# Patient Record
Sex: MALE | Race: BLACK OR AFRICAN AMERICAN | NOT HISPANIC OR LATINO | URBAN - METROPOLITAN AREA
[De-identification: names, ages, dates, MRNs, and addresses within clinical notes are randomized per-mention and may not be internally consistent; named-entity substitution may affect disease eponyms.]

---

## 2023-05-26 ENCOUNTER — EMERGENCY (EMERGENCY)
Facility: HOSPITAL | Age: 63
LOS: 0 days | Discharge: HOME | End: 2023-05-26
Attending: STUDENT IN AN ORGANIZED HEALTH CARE EDUCATION/TRAINING PROGRAM
Payer: MEDICAID

## 2023-05-26 VITALS
OXYGEN SATURATION: 97 % | HEART RATE: 88 BPM | RESPIRATION RATE: 18 BRPM | SYSTOLIC BLOOD PRESSURE: 133 MMHG | DIASTOLIC BLOOD PRESSURE: 88 MMHG | HEIGHT: 73 IN | WEIGHT: 220.9 LBS | TEMPERATURE: 98 F

## 2023-05-26 DIAGNOSIS — R22.9 LOCALIZED SWELLING, MASS AND LUMP, UNSPECIFIED: ICD-10-CM

## 2023-05-26 DIAGNOSIS — N47.2 PARAPHIMOSIS: ICD-10-CM

## 2023-05-26 DIAGNOSIS — N48.89 OTHER SPECIFIED DISORDERS OF PENIS: ICD-10-CM

## 2023-05-26 PROCEDURE — 99283 EMERGENCY DEPT VISIT LOW MDM: CPT

## 2023-05-26 NOTE — ED PROVIDER NOTE - NSFOLLOWUPINSTRUCTIONS_ED_ALL_ED_FT
Paraphimosis    Paraphimosis is where the foreskin can't be returned to its original position after being retracted.    It causes the glans to become painful and swollen and requires emergency medical treatment to avoid serious complications, such as increased pain, swelling and restricted blood flow to the penis.    It may be possible to reduce the pain and inflammation by applying a local anaesthetic gel to the penis and pressing on the glans while pushing the foreskin forward.    In difficult cases, it may be necessary to make a small slit in the foreskin to help relieve the pressure.    In severe cases of paraphimosis, circumcision may be recommended. In very severe cases, a lack of blood flow to the penis can cause tissue death (gangrene) and surgical removal of the penis may be necessary.    Penis hygiene    It's important to clean your penis regularly to avoid problems developing.    You should:    gently wash your penis with warm water each day while having a bath or shower  gently pull back your foreskin (if you have one) and wash underneath; don't pull back the foreskin of a baby or young boy because it could be painful and cause harm  use a mild or non-perfumed soap (if you choose to use soap) to reduce the risk of skin irritation  avoid using talc and deodorants on your penis as they may cause irritation  Circumcised men should also regularly clean their penis with warm water and a mild soap (if you choose to use soap).

## 2023-05-26 NOTE — ED ADULT TRIAGE NOTE - CHIEF COMPLAINT QUOTE
Patient sent to ER from urgent care for swelling to penis for the past 2 days, Denies any burning with urination and discharge to site.

## 2023-05-26 NOTE — ED PROVIDER NOTE - PATIENT PORTAL LINK FT
You can access the FollowMyHealth Patient Portal offered by [facility] by registering at the following website: [email or web address]. By joining [employer]’s FollowMyHealth portal, you will also be able to view your health information using other applications (apps) compatible with our system.

## 2023-05-26 NOTE — ED PROVIDER NOTE - CLINICAL SUMMARY MEDICAL DECISION MAKING FREE TEXT BOX
63-year-old male reports swelling to head of penis after pulling foreskin back yesterday last night when cleaning.  Patient reports pain started then was unable to pull foreskin forward.  Patient denies any dysuria urgency or frequency, reports no other complaints.  Denies any testicular pain    paraphimosis successfully reduced   f/u urology   return precautions

## 2023-05-26 NOTE — ED ADULT NURSE NOTE - NSFALLUNIVINTERV_ED_ALL_ED
Bed/Stretcher in lowest position, wheels locked, appropriate side rails in place/Call bell, personal items and telephone in reach/Instruct patient to call for assistance before getting out of bed/chair/stretcher/Non-slip footwear applied when patient is off stretcher/[place] to call system/Physically safe environment - no spills, clutter or unnecessary equipment/Purposeful proactive rounding/Room/bathroom lighting operational, light cord in reach

## 2023-05-26 NOTE — ED PROVIDER NOTE - OBJECTIVE STATEMENT
63-year-old male reports swelling to head of penis after pulling foreskin back yesterday last night when cleaning.  Patient reports pain started then was unable to pull foreskin forward.  Patient denies any dysuria urgency or frequency, reports no other complaints.  Denies any testicular pain

## 2023-05-26 NOTE — ED ADULT NURSE NOTE - OBJECTIVE STATEMENT
Patient alert and verbally responsive, sent to ER from urgent care for swelling to penis for the past 2 days, Denies any burning with urination and discharge to site.  Denies and injuries to site.

## 2023-05-26 NOTE — ED PROVIDER NOTE - PHYSICAL EXAMINATION
VITAL SIGNS: I have reviewed nursing notes and confirm.  CONSTITUTIONAL: well-appearing, non-toxic, NAD  SKIN: Warm dry, normal skin turgor  CARD: RRR, no murmurs, rubs or gallops  RESP: clear to ausculation b/l.  No rales, rhonchi, or wheezing.  ABD: soft, + BS, non-tender, non-distended,  : paraphimosis noted with glans swelling .  PSYCH: Cooperative, appropriate.

## 2023-05-26 NOTE — ED PROVIDER NOTE - CARE PROVIDER_API CALL
Misha James  Urology  [address] [place] [address]  Phone: [phone]  Fax: [phone]  Follow Up Time: 1-3 Days

## 2023-05-31 ENCOUNTER — EMERGENCY (EMERGENCY)
Facility: HOSPITAL | Age: 63
LOS: 0 days | Discharge: HOME | End: 2023-05-31
Payer: MEDICAID

## 2023-05-31 VITALS
HEIGHT: 73 IN | TEMPERATURE: 99 F | WEIGHT: 220.9 LBS | RESPIRATION RATE: 19 BRPM | SYSTOLIC BLOOD PRESSURE: 120 MMHG | DIASTOLIC BLOOD PRESSURE: 83 MMHG | HEART RATE: 73 BPM | OXYGEN SATURATION: 100 %

## 2023-05-31 VITALS
TEMPERATURE: 98 F | OXYGEN SATURATION: 99 % | HEART RATE: 62 BPM | SYSTOLIC BLOOD PRESSURE: 129 MMHG | RESPIRATION RATE: 16 BRPM | DIASTOLIC BLOOD PRESSURE: 81 MMHG

## 2023-05-31 DIAGNOSIS — N47.2 PARAPHIMOSIS: ICD-10-CM

## 2023-05-31 DIAGNOSIS — R22.9 LOCALIZED SWELLING, MASS AND LUMP, UNSPECIFIED: ICD-10-CM

## 2023-05-31 DIAGNOSIS — Z87.438 PERSONAL HISTORY OF OTHER DISEASES OF MALE GENITAL ORGANS: ICD-10-CM

## 2023-05-31 LAB
ALBUMIN SERPL ELPH-MCNC: 3.7 G/DL — SIGNIFICANT CHANGE UP (ref 3.3–5)
ALP SERPL-CCNC: 81 U/L — SIGNIFICANT CHANGE UP (ref 40–120)
ALT FLD-CCNC: 20 U/L — SIGNIFICANT CHANGE UP (ref 12–78)
ANION GAP SERPL CALC-SCNC: 4 MMOL/L — LOW (ref 5–17)
AST SERPL-CCNC: 17 U/L — SIGNIFICANT CHANGE UP (ref 15–37)
BASOPHILS # BLD AUTO: 0.02 K/UL — SIGNIFICANT CHANGE UP (ref 0–0.2)
BASOPHILS NFR BLD AUTO: 0.2 % — SIGNIFICANT CHANGE UP (ref 0–2)
BILIRUB SERPL-MCNC: 0.4 MG/DL — SIGNIFICANT CHANGE UP (ref 0.2–1.2)
BUN SERPL-MCNC: 19 MG/DL — SIGNIFICANT CHANGE UP (ref 7–23)
CALCIUM SERPL-MCNC: 9.4 MG/DL — SIGNIFICANT CHANGE UP (ref 8.5–10.1)
CHLORIDE SERPL-SCNC: 105 MMOL/L — SIGNIFICANT CHANGE UP (ref 96–108)
CO2 SERPL-SCNC: 31 MMOL/L — SIGNIFICANT CHANGE UP (ref 22–31)
CREAT SERPL-MCNC: 1.16 MG/DL — SIGNIFICANT CHANGE UP (ref 0.5–1.3)
EGFR: 70 ML/MIN/1.73M2 — SIGNIFICANT CHANGE UP
EOSINOPHIL # BLD AUTO: 0.02 K/UL — SIGNIFICANT CHANGE UP (ref 0–0.5)
EOSINOPHIL NFR BLD AUTO: 0.2 % — SIGNIFICANT CHANGE UP (ref 0–6)
GLUCOSE SERPL-MCNC: 138 MG/DL — HIGH (ref 70–99)
HCT VFR BLD CALC: 45.6 % — SIGNIFICANT CHANGE UP (ref 39–50)
HGB BLD-MCNC: 15.7 G/DL — SIGNIFICANT CHANGE UP (ref 13–17)
IMM GRANULOCYTES NFR BLD AUTO: 0.3 % — SIGNIFICANT CHANGE UP (ref 0–0.9)
LYMPHOCYTES # BLD AUTO: 2.17 K/UL — SIGNIFICANT CHANGE UP (ref 1–3.3)
LYMPHOCYTES # BLD AUTO: 23.7 % — SIGNIFICANT CHANGE UP (ref 13–44)
MCHC RBC-ENTMCNC: 30.4 PG — SIGNIFICANT CHANGE UP (ref 27–34)
MCHC RBC-ENTMCNC: 34.4 G/DL — SIGNIFICANT CHANGE UP (ref 32–36)
MCV RBC AUTO: 88.2 FL — SIGNIFICANT CHANGE UP (ref 80–100)
MONOCYTES # BLD AUTO: 0.71 K/UL — SIGNIFICANT CHANGE UP (ref 0–0.9)
MONOCYTES NFR BLD AUTO: 7.7 % — SIGNIFICANT CHANGE UP (ref 2–14)
NEUTROPHILS # BLD AUTO: 6.22 K/UL — SIGNIFICANT CHANGE UP (ref 1.8–7.4)
NEUTROPHILS NFR BLD AUTO: 67.9 % — SIGNIFICANT CHANGE UP (ref 43–77)
NRBC # BLD: 0 /100 WBCS — SIGNIFICANT CHANGE UP (ref 0–0)
PLATELET # BLD AUTO: 229 K/UL — SIGNIFICANT CHANGE UP (ref 150–400)
POTASSIUM SERPL-MCNC: 4.6 MMOL/L — SIGNIFICANT CHANGE UP (ref 3.5–5.3)
POTASSIUM SERPL-SCNC: 4.6 MMOL/L — SIGNIFICANT CHANGE UP (ref 3.5–5.3)
PROT SERPL-MCNC: 7.6 GM/DL — SIGNIFICANT CHANGE UP (ref 6–8.3)
RBC # BLD: 5.17 M/UL — SIGNIFICANT CHANGE UP (ref 4.2–5.8)
RBC # FLD: 13.2 % — SIGNIFICANT CHANGE UP (ref 10.3–14.5)
SODIUM SERPL-SCNC: 140 MMOL/L — SIGNIFICANT CHANGE UP (ref 135–145)
WBC # BLD: 9.17 K/UL — SIGNIFICANT CHANGE UP (ref 3.8–10.5)
WBC # FLD AUTO: 9.17 K/UL — SIGNIFICANT CHANGE UP (ref 3.8–10.5)

## 2023-05-31 PROCEDURE — 99285 EMERGENCY DEPT VISIT HI MDM: CPT

## 2023-05-31 RX ORDER — LEVOFLOXACIN 5 MG/ML
1 INJECTION, SOLUTION INTRAVENOUS
Qty: 3 | Refills: 0
Start: 2023-05-31 | End: 2023-06-02

## 2023-05-31 RX ORDER — MORPHINE SULFATE 50 MG/1
4 CAPSULE, EXTENDED RELEASE ORAL ONCE
Refills: 0 | Status: DISCONTINUED | OUTPATIENT
Start: 2023-05-31 | End: 2023-05-31

## 2023-05-31 RX ADMIN — MORPHINE SULFATE 4 MILLIGRAM(S): 50 CAPSULE, EXTENDED RELEASE ORAL at 11:20

## 2023-05-31 NOTE — CONSULT NOTE ADULT - SUBJECTIVE AND OBJECTIVE BOX
Chief Complaint:  Patient is a 63y old  Male who presents with a chief complaint of     HPI:  Patient is a 63 year old PMHx DM, no surgical history, presenting with swelling at head of penis. Patient reports that last Thursday night, on 5/25/23, he was unable to reduce his foreskin over the glans meatus. He presented to [facility] ED on 5/26/23, where he was found to have paraphimosis, and had it manually reduced. He reports at that time, the head of his penis remained swollen and he was unable to completely reduce foreskin over head. He presents to the ED today again with penile head swelling, associated redness, and cracked glans meatus skin. Reports his penis has "remained the same" since he was last seen in ED. Denies associated penile and scrotal pain, urinary discomfort, burning with urination, increased frequency, inability to urinate, penile discharge fever, blood in urine, chills, nausea, vomiting. Denies associated trauma to area, never had event similar prior to last week.     Urology consulted by ED regarding paraphimosis. Ice applied to penis, patient premedicated with moprhine 4 mg, paraphimosis manually reduced at bedside for >1 hour with successful reduction. After reduction, penile skin full retractable and moves freely over penile head.     REVIEW OF SYSTEMS:  All other review of systems is negative unless indicated above.    PMH/PSH:PAST MEDICAL & SURGICAL HISTORY:  DM, controlled    Allergies:  No Known Allergies      Medications:  Metformin     Physical Exam:    Vital Signs:  Vital Signs Last 24 Hrs  T(C): 37 (31 May 2023 08:58), Max: 37 (31 May 2023 08:58)  T(F): 98.6 (31 May 2023 08:58), Max: 98.6 (31 May 2023 08:58)  HR: 73 (31 May 2023 08:58) (73 - 73)  BP: 120/83 (31 May 2023 08:58) (120/83 - 120/83)  BP(mean): --  RR: 19 (31 May 2023 08:58) (19 - 19)  SpO2: 100% (31 May 2023 08:58) (100% - 100%)    Parameters below as of 31 May 2023 08:58  Patient On (Oxygen Delivery Method): room air      Daily Height in cm: 185.42 (31 May 2023 08:58)    Daily     Constitutional: NAD  HEENT: NC/AT, PERRL, EOMI  Respiratory: no increased work of breathing on room air  : uncircumcised, bilateral scrotum nontender to palpation. +penile head is red, swollen, tender to palpation, with circumferential cracked skin at base of glans head with minimal bleeding. Urethral meatus patent without discharge. Foreskin unable to be retracted over glans head. Manually reduced using external compression, glans head became gradually less swollen, foreskin successfully retracted over glans head.   Skin: warm, dry and intact  Neurologic: AAOx3    Laboratory:                          15.7   9.17  )-----------( 229      ( 31 May 2023 11:20 )             45.6     05-31    140  |  105  |  19  ----------------------------<  138<H>  4.6   |  31  |  1.16    Ca    9.4      31 May 2023 11:20    TPro  7.6  /  Alb  3.7  /  TBili  0.4  /  DBili  x   /  AST  17  /  ALT  20  /  AlkPhos  81  05-31    LIVER FUNCTIONS - ( 31 May 2023 11:20 )  Alb: 3.7 g/dL / Pro: 7.6 gm/dL / ALK PHOS: 81 U/L / ALT: 20 U/L / AST: 17 U/L / GGT: x             Intake and Output    Imaging:

## 2023-05-31 NOTE — ED PROVIDER NOTE - OBJECTIVE STATEMENT
63M history of paraphimosis - reduced in the ED 05/26/23, presents today with persistent swelling of the head of the penis, associated with redness. Denies trauma or injury. Denies fever or chills. No dysuria, hematuria or frequency. Denies abdominal pain. Denies penile discharge. No scrotal pain.

## 2023-05-31 NOTE — ED ADULT TRIAGE NOTE - CHIEF COMPLAINT QUOTE
Patient returning for penile irritation, was treated and released last Friday for paraphimosis. hx DM.

## 2023-05-31 NOTE — ED PROVIDER NOTE - CLINICAL SUMMARY MEDICAL DECISION MAKING FREE TEXT BOX
Patient presents with persistent/recurrent swelling to the penis, consider balanitis vs recurrent paraphimosis, plan for basic labs, UA, urology consult, reassess

## 2023-05-31 NOTE — ED ADULT NURSE NOTE - OBJECTIVE STATEMENT
patient alert and oriented x4, came in for paraphimosis. pt states on 5/26/23 he came in for the same thing and was placed back, but swelling and pain came back. noted swelling and redness to the head of penis. pt denies any trauma, falling, scrotal pain, numbness, tingling, SOB, chest pain, dizziness.

## 2023-05-31 NOTE — ED PROVIDER NOTE - PATIENT PORTAL LINK FT
You can access the FollowMyHealth Patient Portal offered by [facility] by registering at the following website: [email or web address]. By joining [employer]’s FollowMyHealth portal, you will also be able to view your health information using other applications (apps) compatible with our system.

## 2023-05-31 NOTE — CONSULT NOTE ADULT - ASSESSMENT
63 year old male PMHx DM, paraphimosis s/p reduction on 5/26/23, presenting head swelling found to have paraphimosis again. Manually reduced at bedside by urology PA, foreskin now easily retractable over glans head.    - follow up with Dr. Fajardo for outpatient circumcision planning   - may reach Dr. Fajardo at [phone] /// [address] [address]  - educated patient to return to ED if he experiences this issue again, expresses understanding   - discussed with Dr. Fajardo       ************ note incomplete  63 year old male PMHx DM, paraphimosis s/p reduction on 5/26/23, presenting head swelling found to have paraphimosis again. Manually reduced at bedside by urology PA, foreskin now easily retractable over glans head.    - d/c home with levofloxacin x 3 days  - follow up with Dr. Fajardo for outpatient circumcision planning   - may reach Dr. Fajardo at [phone] /// [address] [address]  - educated patient to return to ED if he experiences this issue again, expresses understanding   - discussed with Dr. Fajardo

## 2023-05-31 NOTE — ED ADULT NURSE NOTE - NSFALLUNIVINTERV_ED_ALL_ED
Bed/Stretcher in lowest position, wheels locked, appropriate side rails in place/Call bell, personal items and telephone in reach/Instruct patient to call for assistance before getting out of bed/chair/stretcher/Non-slip footwear applied when patient is off stretcher/[place] to call system/Physically safe environment - no spills, clutter or unnecessary equipment/Purposeful proactive rounding/Room/bathroom lighting operational, light cord in reach

## 2023-05-31 NOTE — ED PROVIDER NOTE - NSFOLLOWUPCLINICS_GEN_ALL_ED_FT
[facility]  Urology  [address] [place] [address]  Phone: [phone]  Fax:

## 2023-06-20 PROBLEM — Z00.00 ENCOUNTER FOR PREVENTIVE HEALTH EXAMINATION: Status: ACTIVE | Noted: 2023-06-20

## 2023-06-26 ENCOUNTER — APPOINTMENT (OUTPATIENT)
Dept: UROLOGY | Facility: CLINIC | Age: 63
End: 2023-06-26
Payer: MEDICAID

## 2023-06-26 VITALS
SYSTOLIC BLOOD PRESSURE: 122 MMHG | HEIGHT: 74 IN | DIASTOLIC BLOOD PRESSURE: 75 MMHG | TEMPERATURE: 98 F | HEART RATE: 62 BPM | WEIGHT: 220 LBS | BODY MASS INDEX: 28.23 KG/M2 | RESPIRATION RATE: 17 BRPM

## 2023-06-26 DIAGNOSIS — N47.1 PHIMOSIS: ICD-10-CM

## 2023-06-26 PROCEDURE — 99203 OFFICE O/P NEW LOW 30 MIN: CPT

## 2023-06-26 NOTE — HISTORY OF PRESENT ILLNESS
[FreeTextEntry1] : Reason for Visit: Phimosis \par \par This is a 63 year-year-old gentleman with phimosis. Patient is referred for evaluation of his condition. _____. Patient denies any gross hematuria or dysuria or urinary incontinence. The patient denies any aggravating or relieving factors. The patient denies any interference of function. The patient is entirely asymptomatic. All other review of systems are negative. Past medical history, family history and social history were inquired and were noncontributory to current condition. Patient [denies tobacco use]. Medications and allergies were reviewed.\par 06/26/2023: Mr. ROPER is a 63 year old male presenting today for Circumcision Consult. He is accompanied today by his friend. He reports he was hospitalized and was recommended to obtain a circumcision. He denies difficulties with urination. He denies pmhx of prostate cancer. \par \par I counseled the patient. I discussed the various etiologies of his symptoms. Risks and alternatives were discussed. I answered the patient questions. The patient will follow-up as directed and will contact me with any questions or concerns. Thank you for the opportunity to participate in the care of this patient. I'll keep you updated on his progress. \par On examination, the patient is a healthy-appearing gentleman in no acute distress. He is alert and oriented follows commands. He has normal mood and affect. He is normocephalic. Oral no thrush. Neck is supple. Respirations are unlabored. His abdomen is soft and nontender. Liver is nonpalpable. Bladder is nonpalpable. No CVA tenderness. Neurologically he is grossly intact. No peripheral edema. Skin without gross abnormality. He has normal male external genitalia. Normal meatus. Bilateral testes are descended intrascrotally and normal to palpation. On rectal examination, there is normal sphincter tone. \par \par Assessment: Phimosis \par \par PLAN: circumcision \par \par I counseled the patient. I discussed the various etiologies of his symptoms. []. Risks and alternatives were discussed. I answered the patient questions. The patient will follow-up as directed and will contact me with any questions or concerns. Thank you for the opportunity to participate in the care of this patient. I'll keep you updated on his progress.\par \par \par

## 2023-06-26 NOTE — PHYSICAL EXAM
[General Appearance - Well Developed] : well developed [General Appearance - Well Nourished] : well nourished [Normal Appearance] : normal appearance [Well Groomed] : well groomed [General Appearance - In No Acute Distress] : no acute distress [Normal Station and Gait] : the gait and station were normal for the patient's age [Oriented To Time, Place, And Person] : oriented to person, place, and time [Affect] : the affect was normal [Mood] : the mood was normal [Not Anxious] : not anxious [FreeTextEntry1] : On examination, Pt is uncircumcised with phimosis.

## 2023-06-26 NOTE — ADDENDUM
[FreeTextEntry1] : This note was authored by El Lopez working as a scribe for Dr. Mariano Tavera. I, Dr. Mariano Tavera have reviewed the content of this note and confirm it is true and accurate. I personally performed the history and physical examination and made all the decisions. 06/26/2023

## 2023-08-17 ENCOUNTER — OUTPATIENT (OUTPATIENT)
Dept: OUTPATIENT SERVICES | Facility: HOSPITAL | Age: 63
LOS: 1 days | End: 2023-08-17
Payer: MEDICAID

## 2023-08-17 VITALS
RESPIRATION RATE: 16 BRPM | HEART RATE: 65 BPM | WEIGHT: 214.95 LBS | DIASTOLIC BLOOD PRESSURE: 77 MMHG | TEMPERATURE: 97 F | HEIGHT: 72 IN | OXYGEN SATURATION: 98 % | SYSTOLIC BLOOD PRESSURE: 120 MMHG

## 2023-08-17 DIAGNOSIS — E11.9 TYPE 2 DIABETES MELLITUS WITHOUT COMPLICATIONS: ICD-10-CM

## 2023-08-17 DIAGNOSIS — N47.1 PHIMOSIS: ICD-10-CM

## 2023-08-17 LAB
A1C WITH ESTIMATED AVERAGE GLUCOSE RESULT: 10 % — HIGH (ref 4–5.6)
ANION GAP SERPL CALC-SCNC: 14 MMOL/L — SIGNIFICANT CHANGE UP (ref 7–14)
BUN SERPL-MCNC: 15 MG/DL — SIGNIFICANT CHANGE UP (ref 7–23)
CALCIUM SERPL-MCNC: 9.5 MG/DL — SIGNIFICANT CHANGE UP (ref 8.4–10.5)
CHLORIDE SERPL-SCNC: 100 MMOL/L — SIGNIFICANT CHANGE UP (ref 98–107)
CO2 SERPL-SCNC: 25 MMOL/L — SIGNIFICANT CHANGE UP (ref 22–31)
CREAT SERPL-MCNC: 1.05 MG/DL — SIGNIFICANT CHANGE UP (ref 0.5–1.3)
EGFR: 79 ML/MIN/1.73M2 — SIGNIFICANT CHANGE UP
ESTIMATED AVERAGE GLUCOSE: 240 — SIGNIFICANT CHANGE UP
GLUCOSE SERPL-MCNC: 192 MG/DL — HIGH (ref 70–99)
HCT VFR BLD CALC: 47.4 % — SIGNIFICANT CHANGE UP (ref 39–50)
HGB BLD-MCNC: 15.7 G/DL — SIGNIFICANT CHANGE UP (ref 13–17)
MCHC RBC-ENTMCNC: 30.4 PG — SIGNIFICANT CHANGE UP (ref 27–34)
MCHC RBC-ENTMCNC: 33.1 GM/DL — SIGNIFICANT CHANGE UP (ref 32–36)
MCV RBC AUTO: 91.9 FL — SIGNIFICANT CHANGE UP (ref 80–100)
NRBC # BLD: 0 /100 WBCS — SIGNIFICANT CHANGE UP (ref 0–0)
NRBC # FLD: 0 K/UL — SIGNIFICANT CHANGE UP (ref 0–0)
PLATELET # BLD AUTO: 225 K/UL — SIGNIFICANT CHANGE UP (ref 150–400)
POTASSIUM SERPL-MCNC: 4.9 MMOL/L — SIGNIFICANT CHANGE UP (ref 3.5–5.3)
POTASSIUM SERPL-SCNC: 4.9 MMOL/L — SIGNIFICANT CHANGE UP (ref 3.5–5.3)
RBC # BLD: 5.16 M/UL — SIGNIFICANT CHANGE UP (ref 4.2–5.8)
RBC # FLD: 13.3 % — SIGNIFICANT CHANGE UP (ref 10.3–14.5)
SODIUM SERPL-SCNC: 139 MMOL/L — SIGNIFICANT CHANGE UP (ref 135–145)
WBC # BLD: 8.69 K/UL — SIGNIFICANT CHANGE UP (ref 3.8–10.5)
WBC # FLD AUTO: 8.69 K/UL — SIGNIFICANT CHANGE UP (ref 3.8–10.5)

## 2023-08-17 PROCEDURE — 93010 ELECTROCARDIOGRAM REPORT: CPT

## 2023-08-17 RX ORDER — EMPAGLIFLOZIN 10 MG/1
1 TABLET, FILM COATED ORAL
Refills: 0 | DISCHARGE

## 2023-08-17 RX ORDER — SITAGLIPTIN AND METFORMIN HYDROCHLORIDE 500; 50 MG/1; MG/1
1 TABLET, FILM COATED ORAL
Refills: 0 | DISCHARGE

## 2023-08-17 NOTE — H&P PST ADULT - HISTORY OF PRESENT ILLNESS
63 year old male with dx of phimosis presents today for presurgical evaluation for ... 63 year old male with dx of phimosis presents today for presurgical evaluation for Circumcision.

## 2023-08-17 NOTE — H&P PST ADULT - FUNCTIONAL STATUS
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

## 2023-08-17 NOTE — H&P PST ADULT - PROBLEM SELECTOR PLAN 2
Pt instructed to hold Janumet on the morning of surgery and to take last dose of Jardiance on 9/2/23.

## 2023-09-06 ENCOUNTER — APPOINTMENT (OUTPATIENT)
Dept: UROLOGY | Facility: AMBULATORY SURGERY CENTER | Age: 63
End: 2023-09-06

## 2024-12-04 NOTE — ED PROVIDER NOTE - NS ED ROS FT
Detail Level: Detailed
Constitutional: See HPI.  Except as documented in the HPI, all other systems are negative.